# Patient Record
Sex: FEMALE | Race: WHITE | Employment: FULL TIME | URBAN - METROPOLITAN AREA
[De-identification: names, ages, dates, MRNs, and addresses within clinical notes are randomized per-mention and may not be internally consistent; named-entity substitution may affect disease eponyms.]

---

## 2017-02-01 ENCOUNTER — APPOINTMENT (OUTPATIENT)
Dept: GENERAL RADIOLOGY | Facility: CLINIC | Age: 62
End: 2017-02-01
Attending: PHYSICIAN ASSISTANT
Payer: MEDICAID

## 2017-02-01 ENCOUNTER — HOSPITAL ENCOUNTER (EMERGENCY)
Facility: CLINIC | Age: 62
Discharge: HOME | End: 2017-02-01
Attending: PHYSICIAN ASSISTANT | Admitting: PHYSICIAN ASSISTANT
Payer: MEDICAID

## 2017-02-01 VITALS
TEMPERATURE: 98.5 F | HEART RATE: 66 BPM | OXYGEN SATURATION: 97 % | BODY MASS INDEX: 32.39 KG/M2 | SYSTOLIC BLOOD PRESSURE: 139 MMHG | HEIGHT: 60 IN | DIASTOLIC BLOOD PRESSURE: 84 MMHG | RESPIRATION RATE: 20 BRPM | WEIGHT: 165 LBS

## 2017-02-01 DIAGNOSIS — M25.551 HIP PAIN, RIGHT: ICD-10-CM

## 2017-02-01 DIAGNOSIS — S39.012A LOW BACK STRAIN, INITIAL ENCOUNTER: ICD-10-CM

## 2017-02-01 PROCEDURE — 72100 X-RAY EXAM L-S SPINE 2/3 VWS: CPT

## 2017-02-01 PROCEDURE — 99284 EMERGENCY DEPT VISIT MOD MDM: CPT | Mod: 25

## 2017-02-01 PROCEDURE — 96374 THER/PROPH/DIAG INJ IV PUSH: CPT

## 2017-02-01 PROCEDURE — 25000128 H RX IP 250 OP 636: Performed by: PHYSICIAN ASSISTANT

## 2017-02-01 PROCEDURE — 73502 X-RAY EXAM HIP UNI 2-3 VIEWS: CPT

## 2017-02-01 RX ORDER — CYCLOBENZAPRINE HCL 10 MG
10 TABLET ORAL 3 TIMES DAILY PRN
Qty: 15 TABLET | Refills: 0 | Status: SHIPPED | OUTPATIENT
Start: 2017-02-01 | End: 2017-02-07

## 2017-02-01 RX ORDER — OXYCODONE AND ACETAMINOPHEN 5; 325 MG/1; MG/1
1-2 TABLET ORAL EVERY 4 HOURS PRN
Qty: 12 TABLET | Refills: 0 | Status: SHIPPED | OUTPATIENT
Start: 2017-02-01

## 2017-02-01 RX ORDER — KETOROLAC TROMETHAMINE 30 MG/ML
30 INJECTION, SOLUTION INTRAMUSCULAR; INTRAVENOUS ONCE
Status: COMPLETED | OUTPATIENT
Start: 2017-02-01 | End: 2017-02-01

## 2017-02-01 RX ADMIN — KETOROLAC TROMETHAMINE 30 MG: 30 INJECTION, SOLUTION INTRAMUSCULAR at 17:05

## 2017-02-01 ASSESSMENT — ENCOUNTER SYMPTOMS
BACK PAIN: 1
NUMBNESS: 0
ROS GI COMMENTS: NEGATIVE FOR BOWEL INCONTINENCE.
NERVOUS/ANXIOUS: 1

## 2017-02-01 NOTE — DISCHARGE INSTRUCTIONS
Please follow up at the [facility] in [place] for assistance with health insurance.    Rest, ice or heat, ibuprofen for pain, you can alternate between flexeril which is muscle relaxer and percocet for severe pain.   If taking the percocet, take stool softener with this to avoid constipation.   Return for worsening pain, loss of bowel or bladder control, persistent radiation of pain, or any other new concerns.     Discharge Instructions  Back Pain  You were seen today for back pain. Back pain can have many causes, but most will get better without surgery or other specific treatment. Sometimes there is a herniated ( slipped ) disc. We don t usually do MRI scans to look for these right away, since most herniated discs will get better on their own with time.  Today, we did not find any evidence that your back pain was caused by a serious condition, such as an infection, fracture, or tumor. However, sometimes symptoms develop over time and cannot be found during an emergency visit, so it is very important that you follow up with your primary doctor.  Return to the Emergency Department if:    You develop a fever with your back pain.     You have weakness or change in sensation in one or both legs.    You lose control of your bowels or bladder, or can t empty your bladder.    Your pain gets much worse.     Follow-up with your doctor:    Unless your pain has completely gone away, please make an appointment with your doctor within one week.  You may need further management of your back pain, such as more pain medication, imaging such as an X-ray or MRI, or physical therapy.    What can I do to help myself?    Remain Active -- People are often afraid that they will hurt their back further or delay recovery by remaining active, but this is one of the best things you can do for your back. In fact, prolonged bed rest is not recommended. Studies have shown that people with low back pain recover faster when they remain  active. Movement helps to bring blood flow to the muscles and relieve muscle spasms as well as preventing loss of muscle strength.    Heat -- Using a heating pad can help with low back pain during the first few weeks. Do not sleep with a heating pad, as you can be burned.     Pain medications - You may take a pain medication such as Tylenol  (acetaminophen), Advil , Nuprin  (ibuprofen) or Aleve  (naproxen).  If you have been given a narcotic such as Vicodin  (hydrocodone with acetaminophen), Percocet  (oxycodone with acetaminophen), codeine, or a muscle relaxant such as Flexeril  (cyclobenzaprine) or Soma  (carisoprodol), do not drive for four hours after you have taken it. If the narcotic contains Tylenol  (acetaminophen), do not take Tylenol  with it. All narcotics will cause constipation, so eat a high fiber diet.   If you were given a prescription for medicine here today, be sure to read all of the information (including the package insert) that comes with your prescription.  This will include important information about the medicine, its side effects, and any warnings that you need to know about.  The pharmacist who fills the prescription can provide more information and answer questions you may have about the medicine.  If you have questions or concerns that the pharmacist cannot address, please call or return to the Emergency Department.   Opioid Medication Information    Pain medications are among the most commonly prescribed medicines, so we are including this information for all our patients. If you did not receive pain medication or get a prescription for pain medicine, you can ignore it.     You may have been given a prescription for an opioid (narcotic) pain medicine and/or have received a pain medicine while here in the Emergency Department. These medicines can make you drowsy or impaired. You must not drive, operate dangerous equipment, or engage in any other dangerous activities while taking these  medications. If you drive while taking these medications, you could be arrested for DUI, or driving under the influence. Do not drink any alcohol while you are taking these medications.     Opioid pain medications can cause addiction. If you have a history of chemical dependency of any type, you are at a higher risk of becoming addicted to pain medications.  Only take these prescribed medications to treat your pain when all other options have been tried. Take it for as short a time and as few doses as possible. Store your pain pills in a secure place, as they are frequently stolen and provide a dangerous opportunity for children or visitors in your house to start abusing these powerful medications. We will not replace any lost or stolen medicine.  As soon as your pain is better, you should flush all your remaining medication.     Many prescription pain medications contain Tylenol  (acetaminophen), including Vicodin , Tylenol #3 , Norco , Lortab , and Percocet .  You should not take any extra pills of Tylenol  if you are using these prescription medications or you can get very sick.  Do not ever take more than 3000 mg of acetaminophen in any 24 hour period.    All opioids tend to cause constipation. Drink plenty of water and eat foods that have a lot of fiber, such as fruits, vegetables, prune juice, apple juice and high fiber cereal.  Take a laxative if you don t move your bowels at least every other day. Miralax , Milk of Magnesia, Colace , or Senna  can be used to keep you regular.      Remember that you can always come back to the Emergency Department if you are not able to see your regular doctor in the amount of time listed above, if you get any new symptoms, or if there is anything that worries you.

## 2017-02-01 NOTE — PROGRESS NOTES
I gave this pt the [facility] information for assistance with Health Insurance.  She also knows about the [facility] in [place]. Staff updated.

## 2017-02-01 NOTE — ED AVS SNAPSHOT
Emergency Department    [address] [facility] [address]    Phone:  [phone]    Fax:  [phone]                                       Alix Albrecht   MRN: [id number]    Department:   Emergency Department   Date of Visit:  2/1/2017           Patient Information     Date Of Birth          1955        Your diagnoses for this visit were:     Low back strain, initial encounter     Hip pain, right        You were seen by Maggi Jain PA-C.      Follow-up Information     Follow up with  Emergency Department.    Specialty:  EMERGENCY MEDICINE    Why:  If symptoms worsen    Contact information:    [address] [facility] [address] [phone]        Follow up with Orthopaedics, [facility] In 1 week.    Why:  if not improving    Contact information:    [address] [address] [phone]          Discharge Instructions       Please follow up at the [facility] in [place] for assistance with health insurance.    Rest, ice or heat, ibuprofen for pain, you can alternate between flexeril which is muscle relaxer and percocet for severe pain.   If taking the percocet, take stool softener with this to avoid constipation.   Return for worsening pain, loss of bowel or bladder control, persistent radiation of pain, or any other new concerns.     Discharge Instructions  Back Pain  You were seen today for back pain. Back pain can have many causes, but most will get better without surgery or other specific treatment. Sometimes there is a herniated ( slipped ) disc. We don t usually do MRI scans to look for these right away, since most herniated discs will get better on their own with time.  Today, we did not find any evidence that your back pain was caused by a serious condition, such as an infection, fracture, or tumor. However, sometimes symptoms develop over time and cannot be found during an emergency visit, so it is very important that you  follow up with your primary doctor.  Return to the Emergency Department if:    You develop a fever with your back pain.     You have weakness or change in sensation in one or both legs.    You lose control of your bowels or bladder, or can t empty your bladder.    Your pain gets much worse.     Follow-up with your doctor:    Unless your pain has completely gone away, please make an appointment with your doctor within one week.  You may need further management of your back pain, such as more pain medication, imaging such as an X-ray or MRI, or physical therapy.    What can I do to help myself?    Remain Active -- People are often afraid that they will hurt their back further or delay recovery by remaining active, but this is one of the best things you can do for your back. In fact, prolonged bed rest is not recommended. Studies have shown that people with low back pain recover faster when they remain active. Movement helps to bring blood flow to the muscles and relieve muscle spasms as well as preventing loss of muscle strength.    Heat -- Using a heating pad can help with low back pain during the first few weeks. Do not sleep with a heating pad, as you can be burned.     Pain medications - You may take a pain medication such as Tylenol  (acetaminophen), Advil , Nuprin  (ibuprofen) or Aleve  (naproxen).  If you have been given a narcotic such as Vicodin  (hydrocodone with acetaminophen), Percocet  (oxycodone with acetaminophen), codeine, or a muscle relaxant such as Flexeril  (cyclobenzaprine) or Soma  (carisoprodol), do not drive for four hours after you have taken it. If the narcotic contains Tylenol  (acetaminophen), do not take Tylenol  with it. All narcotics will cause constipation, so eat a high fiber diet.   If you were given a prescription for medicine here today, be sure to read all of the information (including the package insert) that comes with your prescription.  This will include important information  about the medicine, its side effects, and any warnings that you need to know about.  The pharmacist who fills the prescription can provide more information and answer questions you may have about the medicine.  If you have questions or concerns that the pharmacist cannot address, please call or return to the Emergency Department.   Opioid Medication Information    Pain medications are among the most commonly prescribed medicines, so we are including this information for all our patients. If you did not receive pain medication or get a prescription for pain medicine, you can ignore it.     You may have been given a prescription for an opioid (narcotic) pain medicine and/or have received a pain medicine while here in the Emergency Department. These medicines can make you drowsy or impaired. You must not drive, operate dangerous equipment, or engage in any other dangerous activities while taking these medications. If you drive while taking these medications, you could be arrested for DUI, or driving under the influence. Do not drink any alcohol while you are taking these medications.     Opioid pain medications can cause addiction. If you have a history of chemical dependency of any type, you are at a higher risk of becoming addicted to pain medications.  Only take these prescribed medications to treat your pain when all other options have been tried. Take it for as short a time and as few doses as possible. Store your pain pills in a secure place, as they are frequently stolen and provide a dangerous opportunity for children or visitors in your house to start abusing these powerful medications. We will not replace any lost or stolen medicine.  As soon as your pain is better, you should flush all your remaining medication.     Many prescription pain medications contain Tylenol  (acetaminophen), including Vicodin , Tylenol #3 , Norco , Lortab , and Percocet .  You should not take any extra pills of Tylenol  if you are  using these prescription medications or you can get very sick.  Do not ever take more than 3000 mg of acetaminophen in any 24 hour period.    All opioids tend to cause constipation. Drink plenty of water and eat foods that have a lot of fiber, such as fruits, vegetables, prune juice, apple juice and high fiber cereal.  Take a laxative if you don t move your bowels at least every other day. Miralax , Milk of Magnesia, Colace , or Senna  can be used to keep you regular.      Remember that you can always come back to the Emergency Department if you are not able to see your regular doctor in the amount of time listed above, if you get any new symptoms, or if there is anything that worries you.        24 Hour Appointment Hotline       To make an appointment at any [facility] [place], call [phone] ([phone]). If you don't have a family doctor or clinic, we will help you find one. [facility] are conveniently located to serve the needs of you and your family.             Review of your medicines      START taking        Dose / Directions Last dose taken    cyclobenzaprine 10 MG tablet   Commonly known as:  FLEXERIL   Dose:  10 mg   Quantity:  15 tablet        Take 1 tablet (10 mg) by mouth 3 times daily as needed for muscle spasms   Refills:  0          CONTINUE these medicines which may have CHANGED, or have new prescriptions. If we are uncertain of the size of tablets/capsules you have at home, strength may be listed as something that might have changed.        Dose / Directions Last dose taken    oxyCODONE-acetaminophen 5-325 MG per tablet   Commonly known as:  PERCOCET   Dose:  1-2 tablet   What changed:    - how much to take  - when to take this   Quantity:  12 tablet        Take 1-2 tablets by mouth every 4 hours as needed for pain   Refills:  0                Prescriptions were sent or printed at these locations (2 Prescriptions)                   Other Prescriptions                Printed at  Department/Unit printer (2 of 2)         cyclobenzaprine (FLEXERIL) 10 MG tablet               oxyCODONE-acetaminophen (PERCOCET) 5-325 MG per tablet                Procedures and tests performed during your visit     IV access    Lumbar spine XR, 2-3 views    XR Pelvis w Hip Right 1 View      Orders Needing Specimen Collection     None      Pending Results     Date and Time Order Name Status Description    2/1/2017 1622 XR Pelvis w Hip Right 1 View Preliminary     2/1/2017 1622 Lumbar spine XR, 2-3 views Preliminary             Pending Culture Results     No orders found from 1/31/2017 to 2/2/2017.       Test Results from your hospital stay           2/1/2017  5:24 PM - Interface, Radiant Ib      Narrative     LUMBAR SPINE TWO - THREE VIEWS  2/1/2017 5:21 PM      HISTORY: Pain status post trauma.    COMPARISON: None.        Impression     IMPRESSION: No acute fracture or malalignment. Multilevel degenerative  disease.         2/1/2017  5:25 PM - Interface, Radiant Ib      Narrative     PELVIS AND HIP RIGHT ONE VIEW   2/1/2017 5:22 PM     HISTORY: Pain status post trauma.    COMPARISON: None.    FINDINGS: Negative pelvis and right hip. No fracture identified.        Impression     IMPRESSION: Negative.                Clinical Quality Measure: Blood Pressure Screening     Your blood pressure was checked while you were in the emergency department today. The last reading we obtained was  BP: 159/85 mmHg . Please read the guidelines below about what these numbers mean and what you should do about them.  If your systolic blood pressure (the top number) is less than 120 and your diastolic blood pressure (the bottom number) is less than 80, then your blood pressure is normal. There is nothing more that you need to do about it.  If your systolic blood pressure (the top number) is 120-139 or your diastolic blood pressure (the bottom number) is 80-89, your blood pressure may be higher than it should be. You should have your  "blood pressure rechecked within a year by a primary care provider.  If your systolic blood pressure (the top number) is 140 or greater or your diastolic blood pressure (the bottom number) is 90 or greater, you may have high blood pressure. High blood pressure is treatable, but if left untreated over time it can put you at risk for heart attack, stroke, or kidney failure. You should have your blood pressure rechecked by a primary care provider within the next 4 weeks.  If your provider in the emergency department today gave you specific instructions to follow-up with your doctor or provider even sooner than that, you should follow that instruction and not wait for up to 4 weeks for your follow-up visit.        Thank you for choosing [facility]       Thank you for choosing [facility] for your care. Our goal is always to provide you with excellent care. Hearing back from our patients is one way we can continue to improve our services. Please take a few minutes to complete the written survey that you may receive in the mail after you visit with us. Thank you!        [employer] Information     [employer] lets you send messages to your doctor, view your test results, renew your prescriptions, schedule appointments and more. To sign up, go to [email or web address] . Click on \"Log in\" on the left side of the screen, which will take you to the Welcome page. Then click on \"Sign up Now\" on the right side of the page.     You will be asked to enter the access code listed below, as well as some personal information. Please follow the directions to create your username and password.     Your access code is: [email or web address]  Expires: 2017  5:02 PM     Your access code will  in 90 days. If you need help or a new code, please call your [facility] clinic or [phone].        Care EveryWhere ID     This is your Care EveryWhere ID. This could be used by other organizations to access your [facility] medical records  [id number]      "   After Visit Summary       This is your record. Keep this with you and show to your community pharmacist(s) and doctor(s) at your next visit.

## 2017-02-01 NOTE — ED AVS SNAPSHOT
Emergency Department    [address] [place] [address]    Phone:  [phone]    Fax:  [phone]                                       Alix Albrecht   MRN: [id number]    Department:   Emergency Department   Date of Visit:  2/1/2017           After Visit Summary Signature Page     I have received my discharge instructions, and my questions have been answered. I have discussed any challenges I see with this plan with the nurse or doctor.    ..........................................................................................................................................  Patient/Patient Representative Signature      ..........................................................................................................................................  Patient Representative Print Name and Relationship to Patient    ..................................................               ................................................  Date                                            Time    ..........................................................................................................................................  Reviewed by Signature/Title    ...................................................              ..............................................  Date                                                            Time

## 2017-02-01 NOTE — ED PROVIDER NOTES
History     Chief Complaint:  Back Pain    HPI   Alix Albrecht is a 61 year old female who presents with progressively worsening low back pain since slipping and falling on the ice four days ago. The patient describes sharp pain that radiates across the low back from hip to hip and down the right side of her thigh. The pain is constant but worse with bending, heavy lifting, sitting, or walking sideways. Her back is still painful with standing but that is the most comfortable position for her. She reports feelings of constipation due to pain with sitting on the toilet, but she had a normal bowel movement this morning and has not had any bladder or bowel incontinence. The patient denies any numbness or tingling. She endorses significant stress and anxiety related to health insurance troubles and a busy work schedule. The patient is a  and they are coming up on a busy time of year so her boss is still making her lift things despite being in pain.      Allergies:  The patient has no known drug allergies.     Medications:    The patient is currently on no regular medications.        Past Medical History:    History reviewed.  No significant past medical history.      Past Surgical History:    History reviewed.  No significant past surgical history.      Family History:    History reviewed.  No significant family history.      Social History:  Relationship status:   Tobacco use: Positive  Alcohol use: Positive  The patient presents alone.     Review of Systems   Gastrointestinal:        Negative for bowel incontinence.    Genitourinary:        Negative for bladder incontinence.    Musculoskeletal: Positive for back pain.   Neurological: Negative for numbness.        Negative for paresthesias.    Psychiatric/Behavioral: The patient is nervous/anxious.    All other systems reviewed and are negative.    Physical Exam     Patient Vitals for the past 24 hrs:   BP Temp Temp src Pulse Resp SpO2 Height Weight    02/01/17 1800 - - - 66 20 97 % - -   02/01/17 1755 139/84 mmHg - - - - - - -   02/01/17 1553 159/85 mmHg 98.5  F (36.9  C) Oral 81 18 98 % 1.524 m (5') 74.844 kg (165 lb)       Physical Exam  Nursing note and vitals reviewed.     GENERAL: Alert, mild distress, non toxic appearing; yells when attempting to sit down on the gurney.   HEENT: Normal conjunctiva. No scleral icterus.   NECK: Supple.  CARDIAC: Normal rate and regular rhythm. Normal heart sounds. No murmurs, rubs, or gallops appreciated. Intact distal pedal pulses 2+ and symmetric.   PULMONARY: CTA bilaterally. Normal breath sounds. No wheezing, crackles, or rhonchi appreciated.   ABDOMEN: Soft, non-tender, non-distended. No rebound or guarding.   NEURO: Alert and oriented. No cranial nerve deficit. Sensation intact throughout including saddle area. 5/5 strength of bilateral lower extremities. Active DF/PF intact. Bilateral achilles reflexes intact 2+. Normal gait.   MUSCULOSKELETAL: No peripheral edema. No CVA tenderness. No midline tenderness over thoracic, lumbar, and sacral spine. Mild tenderness over bilateral paraspinous musculature of lumbar spine. Right hip without obvious deformity, tenderness to lateral hip; no tenderness over right knee or ankle.  SKIN: Skin is warm and dry. No rashes. No pallor or jaundice. No erythema, edema, or warmth noted over spine.  PSYCH: Normal affect and mood.      Emergency Department Course   Imaging:  Radiographic findings were communicated with the patient who voiced understanding of the findings.    Pelvis with Right Hip XR, per radiology:   Negative.     Lumbar Spine XR, per radiology:   No acute fracture or malalignment. Multilevel degenerative disease.     Interventions:  1705: Toradol, 30 mg, IV injection    Emergency Department Course:  Nursing notes and vitals reviewed.  I performed an exam of the patient as documented above.  The above workup was undertaken.  1728: I rechecked the patient and discussed  results.  Findings and plan explained to the Patient. Patient discharged home with instructions regarding supportive care, medications, and reasons to return. The importance of close follow-up was reviewed. The patient was prescribed Flexeril and Percocet.      Impression & Plan    Medical Decision Making:  Alix Albrecht is a 61 year old female who presented to the ED for evaluation of back pain and right hip pain after a fall three days ago. She did not hit her head or lose consciousness. X-rays of the lumbar spine and right hip and pelvis are negative for acute fracture or dislocation. The patient did not have any high risk features, including fever, focal neurologic symptoms, saddle anesthesia, or bowel or bladder dysfunction. There is no clinical evidence of acute cord compression, cauda equina syndrome, discitis, spinal hematoma or epidural abscess, or serious acute intraabdominal pathology for the patient's presenting complaint. The patient was neurovascularly intact on physical exam and findings at this time are consistent with musculoskeletal strain/contusion from her fall. She does report pain radiating to her right thigh, which I suspect is likely related to the inflammation/muscle strain, but did advise her to follow up with ortho or PCP if this does not improve over the next few days. The patient will be discharged with pain medication and muscle relaxer to use for pain control. I did review side effect of constipation and to take a stool softener with the narcotic medication. The patient was counseled regarding the need for supportive care, including ice/heat to the back. The patient was advised to avoid heavy lifting, bending, or twisting. No other acute injuries noted on exam. Indications for return to seek emergent evaluation were reviewed, including increasing pain, numbness, weakness, or bowel or bladder dysfunction. The patient was in agreement with plan and discharged in satisfactory condition  with all questions answered.     Diagnosis:    ICD-10-CM   1. Low back strain, initial encounter S39.012A   2. Hip pain, right M25.551     Disposition:  Discharge home with orthopedics follow up.     Discharge Medications:  New Prescriptions    CYCLOBENZAPRINE (FLEXERIL) 10 MG TABLET    Take 1 tablet (10 mg) by mouth 3 times daily as needed for muscle spasms    OXYCODONE-ACETAMINOPHEN (PERCOCET) 5-325 MG PER TABLET    Take 1-2 tablets by mouth every 4 hours as needed for pain       Neva MCDANIEL, damian serving as a scribe on 2/1/2017 at 4:15 PM to personally document services performed by Maggi Jain PA-C, based on my observations and the provider's statements to me.     EMERGENCY DEPARTMENT    Maggi Jain PA-C  02/01/17 1823

## 2017-02-01 NOTE — LETTER
EMERGENCY DEPARTMENT  [address] [facility] [address]  Phone: [phone]  Fax: [phone]    February 1, 2017        Alix lAbrecht  [address] [facility] [address]          To whom it may concern:    RE: Alix Albrecht    Please excuse Alix from work tomorrow due to her back injury.    Please contact me for questions or concerns.      Sincerely,  Maggi Jain PA-C

## 2021-05-28 ENCOUNTER — RECORDS - HEALTHEAST (OUTPATIENT)
Dept: ADMINISTRATIVE | Facility: CLINIC | Age: 66
End: 2021-05-28